# Patient Record
Sex: MALE | Race: WHITE
[De-identification: names, ages, dates, MRNs, and addresses within clinical notes are randomized per-mention and may not be internally consistent; named-entity substitution may affect disease eponyms.]

---

## 2020-05-13 ENCOUNTER — HOSPITAL ENCOUNTER (OUTPATIENT)
Dept: HOSPITAL 92 - SCSULT | Age: 60
Discharge: HOME | End: 2020-05-13
Attending: NURSE PRACTITIONER
Payer: COMMERCIAL

## 2020-05-13 DIAGNOSIS — R10.11: Primary | ICD-10-CM

## 2020-05-13 PROCEDURE — 76705 ECHO EXAM OF ABDOMEN: CPT

## 2020-05-13 NOTE — ULT
GALLBLADDER ULTRASOUND:

 

Indications: Right upper quadrant pain. 

 

FINDINGS: 

Patient is post cholecystectomy many years ago. 

 

Common bile duct is normal caliber measuring at 4 mm.

 

Visualized liver is unremarkable. The pancreas is obscured. Visualized right kidney appears normal. 

 

IMPRESSION: 

Unremarkable right upper quadrant ultrasound. 

 

POS: AGW

## 2021-05-06 ENCOUNTER — HOSPITAL ENCOUNTER (OUTPATIENT)
Dept: HOSPITAL 92 - CSHRAD | Age: 61
Discharge: HOME | End: 2021-05-06
Attending: FAMILY MEDICINE
Payer: COMMERCIAL

## 2021-05-06 DIAGNOSIS — S39.012D: Primary | ICD-10-CM

## 2021-05-06 PROCEDURE — 72220 X-RAY EXAM SACRUM TAILBONE: CPT

## 2021-05-06 PROCEDURE — 72070 X-RAY EXAM THORAC SPINE 2VWS: CPT

## 2021-05-06 PROCEDURE — 72100 X-RAY EXAM L-S SPINE 2/3 VWS: CPT

## 2023-07-28 ENCOUNTER — HOSPITAL ENCOUNTER (EMERGENCY)
Dept: HOSPITAL 92 - CSHERS | Age: 63
Discharge: HOME | End: 2023-07-28
Payer: COMMERCIAL
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
## 2023-07-28 DIAGNOSIS — T22.20XA: Primary | ICD-10-CM

## 2023-07-28 PROCEDURE — 99283 EMERGENCY DEPT VISIT LOW MDM: CPT
